# Patient Record
Sex: FEMALE | Race: WHITE
[De-identification: names, ages, dates, MRNs, and addresses within clinical notes are randomized per-mention and may not be internally consistent; named-entity substitution may affect disease eponyms.]

---

## 2019-08-05 ENCOUNTER — HOSPITAL ENCOUNTER (EMERGENCY)
Dept: HOSPITAL 15 - ER | Age: 64
Discharge: HOME | End: 2019-08-05
Payer: SELF-PAY

## 2019-08-05 VITALS — DIASTOLIC BLOOD PRESSURE: 92 MMHG | SYSTOLIC BLOOD PRESSURE: 144 MMHG

## 2019-08-05 VITALS — WEIGHT: 190 LBS | BODY MASS INDEX: 37.3 KG/M2 | HEIGHT: 60 IN
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
## 2019-08-05 DIAGNOSIS — I10: ICD-10-CM
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
## 2019-08-05 DIAGNOSIS — M54.5: Primary | ICD-10-CM

## 2019-08-05 PROCEDURE — 72131 CT LUMBAR SPINE W/O DYE: CPT

## 2019-08-05 PROCEDURE — 96372 THER/PROPH/DIAG INJ SC/IM: CPT

## 2019-08-05 PROCEDURE — 99284 EMERGENCY DEPT VISIT MOD MDM: CPT

## 2025-02-23 ENCOUNTER — HOSPITAL ENCOUNTER (EMERGENCY)
Dept: HOSPITAL 15 - ER | Age: 70
Discharge: HOME | End: 2025-02-23
Payer: COMMERCIAL

## 2025-02-23 VITALS
RESPIRATION RATE: 16 BRPM | DIASTOLIC BLOOD PRESSURE: 89 MMHG | TEMPERATURE: 98.6 F | HEART RATE: 86 BPM | OXYGEN SATURATION: 99 % | SYSTOLIC BLOOD PRESSURE: 168 MMHG

## 2025-02-23 VITALS — HEIGHT: 58 IN | WEIGHT: 200.18 LBS | BODY MASS INDEX: 42.02 KG/M2

## 2025-02-23 DIAGNOSIS — I10: ICD-10-CM

## 2025-02-23 DIAGNOSIS — M25.511: ICD-10-CM

## 2025-02-23 DIAGNOSIS — R00.2: Primary | ICD-10-CM

## 2025-02-23 DIAGNOSIS — M54.2: ICD-10-CM

## 2025-02-23 DIAGNOSIS — R07.9: ICD-10-CM

## 2025-02-23 DIAGNOSIS — E66.9: ICD-10-CM

## 2025-02-23 LAB
ALBUMIN SERPL-MCNC: 4.3 G/DL (ref 3.2–4.8)
ALP SERPL-CCNC: 114 U/L (ref 46–116)
ALT SERPL-CCNC: 21 U/L (ref 7–40)
ANION GAP SERPL CALCULATED.3IONS-SCNC: 6 MMOL/L (ref 5–15)
BILIRUB SERPL-MCNC: 0.3 MG/DL (ref 0.2–1)
BUN SERPL-MCNC: 16 MG/DL (ref 9–23)
BUN/CREAT SERPL: 16.7 (ref 10–20)
CALCIUM SERPL-MCNC: 9.4 MG/DL (ref 8.7–10.4)
CHLORIDE SERPL-SCNC: 108 MMOL/L (ref 98–107)
CO2 SERPL-SCNC: 29 MMOL/L (ref 20–31)
GLUCOSE SERPL-MCNC: 115 MG/DL (ref 74–106)
HCT VFR BLD AUTO: 41.4 % (ref 36–46)
HGB BLD-MCNC: 13.1 G/DL (ref 12.2–16.2)
MCH RBC QN AUTO: 27.4 PG (ref 28–32)
MCV RBC AUTO: 86.5 FL (ref 80–100)
NRBC BLD QL AUTO: 0.1 %
POTASSIUM SERPL-SCNC: 4 MMOL/L (ref 3.5–5.1)
PROT SERPL-MCNC: 6.7 G/DL (ref 5.7–8.2)
SODIUM SERPL-SCNC: 143 MMOL/L (ref 136–145)

## 2025-02-23 PROCEDURE — 81001 URINALYSIS AUTO W/SCOPE: CPT

## 2025-02-23 PROCEDURE — 93005 ELECTROCARDIOGRAM TRACING: CPT

## 2025-02-23 PROCEDURE — 80053 COMPREHEN METABOLIC PANEL: CPT

## 2025-02-23 PROCEDURE — 84484 ASSAY OF TROPONIN QUANT: CPT

## 2025-02-23 PROCEDURE — 85025 COMPLETE CBC W/AUTO DIFF WBC: CPT

## 2025-02-23 PROCEDURE — 71045 X-RAY EXAM CHEST 1 VIEW: CPT

## 2025-02-23 PROCEDURE — 36415 COLL VENOUS BLD VENIPUNCTURE: CPT

## 2025-02-23 NOTE — [facility]
CHEST RADIOGRAPH



Indication: chest pain



Technique: Single frontal view of the chest was obtained



Comparison: None



FINDINGS:



Lines and Tubes: None



Lungs: No focal consolidation.



Pleura: No effusion.



No pneumothorax. 



Cardiomediastinal contours: Unremarkable



Bones: No acute osseous abnormality.



IMPRESSION: 



1. No acute cardiopulmonary disease.



Electronically Signed by: Blue Sanders at 02/23/2025 20:32:08 PM

## 2025-02-23 NOTE — ED.PDOC
History of Present Illness


HPI Comments


68 y/o F, with a history of HTN and obesity, presents with c/o chest pain, 

palpitations, dizzy spells, and right neck and shoulder pain, today. Patient is 

a poor historian and endorses on having symptoms, intermittently, for the past 

few days following initial unprovoked onset of symptoms. He denies having any 

nausea, vomiting, shortness of breath, weakness, fever, chills, or other 

associated symptoms or modifiers at this time.


Chief Complaint:  Chest Pain


Time Seen by MD:  19:50


Primary Care Provider:  UNKNOWN


Reviewed Notes:  Nurses Notes, Medications, Allergies


Allergies:  


Coded Allergies:  


     NO KNOWN ALLERGIES (Unverified , 19)


Information Source:  Patient


Mode of Arrival:  Ambulatory


Severity:  Moderate


Timing:  Hours


Duration:  Since onset


Prehospital treatment:  None





Past Medical History


PAST MEDICAL HISTORY:  HTN


Past Medical History (Other):  


obesity


Surgical History:  Denies all surgeries


GYN History:  Denies all GYN Hx





Family History


Family History:  Unknown





Social History


Smoker:  Non-Smoker


Alcohol:  Denies ETOH Use


Drugs:  Denies Drug Use


Lives In:  Home





All Other Systems:  Reviewed and Negative (comprehensive system review is 

negative unless otherwise stated above in HPI)





Physical Exam


General Appearance:  Mild Distress, Obese


HEENT:  Normal ENT Inspection, Pharynx Normal, TMs Normal


Neck:  Full Range of Motion, Non-Tender, Normal, Normal Inspection


Respiratory:  Chest Non-Tender, Lungs Clear, No Accessory Muscle Use, No 

Respiratory Distress, Normal Breath Sounds


Cardiovascular:  No Edema, No JVD, No Murmur, No Gallop, Normal Peripheral 

Pulses, Regular Rate/Rhythm


Breast Exam:  Deferred


Gastrointestinal:  No Organomegaly, Non Tender, No Pulsatile Mass, Normal Bowel 

Sounds, Soft


Genitalia:  Deferred


Pelvic:  Deferred


Rectal:  Deferred


Extremities:  No calf tenderness, Normal capillary refill, Normal inspection, 

Normal range of motion, Non-tender, No pedal edema


Musculoskeletal :  


   Apperance:  Normal


Neurologic:  Alert, CNs II-XII nml as Tested, No Motor Deficits, Normal Affect, 

Normal Mood, No Sensory Deficits


Cerebellar Function:  Normal


Reflexes:  Normal


Skin:  Dry, Normal Color, Warm


Lymphatic:  No Adenopathy





Was a procedure done?


Was a procedure done?:  No





EKG


EKG :  


   Pulse Rate (adult):  92


   Axis:  Normal


   Cardiac Rhythm:  NSR


   Block:  None


   Hypertrophy:  None


   ST:  Normal





Differential Dx


Considerations may include:


MI, ACS, PE, PNA, URI, anxiety, angina, musculoskeletal pain, viral syndrome, 

gastritis





X-Ray, Labs, Meds, VS





                                   Vital Signs








  Date Time  Temp Pulse Resp B/P (MAP) Pulse Ox O2 Delivery O2 Flow Rate FiO2


 


25 22:31  86 16  99 Room Air* 0 21


 


25 22:31 98.6 86 16 168/89 (115) 99   





 98.6       


 


25 20:08  92      


 


25 19:36  92      


 


25 19:35 97.8 104 18 153/70 (97) 99   








                                       Lab








Test


 25


21:00 25


19:43 Range/Units


 


 


Troponin I High Sensitivity 4  6  </=34  ng/L


 


White Blood Count


 


 5.4 


 4.4-10.8


10^3/uL


 


Red Blood Count


 


 4.78 


 4.0-5.20


10^6/uL


 


Hemoglobin  13.1  12.2-16.2  g/dL


 


Hematocrit  41.4  36.0-46.0  %


 


Mean Corpuscular Volume  86.5  80.0-100.0  fL


 


Mean Corpuscular Hemoglobin  27.4 L 28.0-32.0  pg


 


Mean Corpuscular Hemoglobin


Concent 


 31.7 L


 32.0-36.0  g/dL





 


Red Cell Distribution Width  15.0 H 11.8-14.3  %


 


Platelet Count


 


 224 


 140-450


10^3/uL


 


Mean Platelet Volume  9.0  6.9-10.8  fL


 


Neutrophils (%) (Auto)  42.6  37.0-80.0  %


 


Lymphocytes (%) (Auto)  45.6  10.0-50.0  %


 


Monocytes (%) (Auto)  9.3  0.0-12.0  %


 


Eosinophils (%) (Auto)  1.8  0.0-7.0  %


 


Basophils (%) (Auto)  0.7  0.0-2.0  %


 


Neutrophils # (Auto)


 


 2.3 


 1.6-8.6  10


^3/uL


 


Lymphocytes # (Auto)


 


 2.4 


 0.4-5.4  10


^3/uL


 


Monocytes # (Auto)  0.5  0-1.3  10 ^3/uL


 


Eosinophils # (Auto)  0.1  0-0.8  10 ^3/uL


 


Basophils # (Auto)  0  0-0.2  10 ^3/uL


 


Nucleated Red Blood Cells  0.1   %


 


Sodium Level  143  136-145  mmol/L


 


Potassium Level  4.0  3.5-5.1  mmol/L


 


Chloride Level  108 H   mmol/L


 


Carbon Dioxide Level  29  20-31  mmol/L


 


Anion Gap  6  5-15  


 


Blood Urea Nitrogen  16  9-23  mg/dL


 


Creatinine


 


 0.96 


 0.550-1.02


mg/dL


 


Glomerular Filtration Rate


Calc 


 65 


 >90  mL/min





 


BUN/Creatinine Ratio  16.7  10.0-20.0  


 


Serum Glucose  115 H   mg/dL


 


Calcium Level  9.4  8.7-10.4  mg/dL


 


Total Bilirubin  0.3  0.2-1.0  mg/dL


 


Aspartate Amino Transferase


(AST) 


 16 


 13-40  U/L





 


Alanine Aminotransferase (ALT)  21  7-40  U/L


 


Alkaline Phosphatase  114    U/L


 


Total Protein  6.7  5.7-8.2  g/dL


 


Albumin  4.3  3.2-4.8  g/dL











[place] [id number]


Ph: [phone]





DIAGNOSTIC IMAGING


Diagnostic Imaging Report : [id number]


Signed








PATIENT: LEEANN MCKEON   ACCT: [id number]   UNIT: [id number]


: 1955   LOC: ER   ROOM / BED:  / 


AGE / SEX: 69 / F   ADM STATUS: REG ER   SERVICE DT: 25





ORDERING PHYSICIAN: KULWANT CHAVEZ MD


PROCEDURE(s): CXR1 - CHEST XRAY 1 VIEW


REASON: chest pain


ORDER NUMBER(s): [id number], ACCESSION NUMBER(s): [id number]








CHEST RADIOGRAPH





Indication: chest pain





Technique: Single frontal view of the chest was obtained





Comparison: None





FINDINGS:





Lines and Tubes: None





Lungs: No focal consolidation.





Pleura: No effusion.





No pneumothorax. 





Cardiomediastinal contours: Unremarkable





Bones: No acute osseous abnormality.





IMPRESSION: 





1. No acute cardiopulmonary disease.





Electronically Signed by: Mayito Osman at 2025 20:32:08 PM











DICTATED BY: MAYITO OSMAN Jr., DO


DICTATED DATE/TIME: 25





SIGNED BY: MAYITO OSMAN Jr., 


SIGNED DATE/TIME: 25





CC:


Time of 1ST Reevaluation:  20:20


Reevaluation 1ST:  Unchanged


Time of 2ND Reevaluation:  21:49


Reevaluation 2ND:  Improved


Patient Education/Counseling:  Diagnosis, Treatment


Family Education/Counseling:  No Family Present





Departure 1


Departure


Time of Disposition:  21:49


Impression:  


   Primary Impression:  


   Palpitations


Disposition:  01 HOME / SELF CARE / HOMELESS


Condition:  Stable


Discharged With:  Self





Critical Care Note


Critical Care Time?:  No





Stability


Stability form required:  No





Heart Score


Heart Score:  








Heart Score Response (Comments) Value


 


History Slightly Suspicious 0


 


EKG Normal 0


 


Age >65 2


 


Risk Factors                            1 or 2 risk factors 1


 


Troponin Normal limit 0


 


Total  3














I personally scribed for KULWANT CHAVEZ MD ([email or web address]) on 25 at 20:08.  

Electronically submitted by Santino Olmedo ([email or web address]).


I personally scribed for KULWANT CHAVEZ MD ([email or web address]) on 25 at 20:49.  

Electronically submitted by Santino Olmedo ([email or web address]).





KULWANT CHAVEZ MD            2025 20:08

## 2025-02-23 NOTE — ECG
[facility]

                                       

Test Date:    2025               Test Time:    19:36:52

Pat Name:     LEEANN MCKEON              Department:   ED

Patient ID:   [id number]           Room:          

Gender:       F                        Technician:   DONNY

:          1955               Requested By: AYAKA WARD

Order Number: [id number]        Reading MD:    

                                 Measurements

Intervals                              Axis          

Rate:         92                       P:            62

DC:           170                      QRS:          45

QRSD:         96                       T:            37

QT:           370                                    

QTc:          458                                    

                           Interpretive Statements

Sinus rhythm

Anterior infarct, old



Please click the below link to view image of tracing.